# Patient Record
Sex: MALE | Race: WHITE
[De-identification: names, ages, dates, MRNs, and addresses within clinical notes are randomized per-mention and may not be internally consistent; named-entity substitution may affect disease eponyms.]

---

## 2020-02-26 ENCOUNTER — HOSPITAL ENCOUNTER (EMERGENCY)
Dept: HOSPITAL 74 - JER | Age: 55
Discharge: ANOTHER HOSPITAL | End: 2020-02-26
Payer: COMMERCIAL

## 2020-02-26 VITALS — DIASTOLIC BLOOD PRESSURE: 90 MMHG | HEART RATE: 94 BPM | SYSTOLIC BLOOD PRESSURE: 148 MMHG | TEMPERATURE: 98.9 F

## 2020-02-26 VITALS — BODY MASS INDEX: 22.9 KG/M2

## 2020-02-26 DIAGNOSIS — Z85.9: ICD-10-CM

## 2020-02-26 DIAGNOSIS — Z90.02: ICD-10-CM

## 2020-02-26 DIAGNOSIS — K76.9: ICD-10-CM

## 2020-02-26 DIAGNOSIS — B19.20: ICD-10-CM

## 2020-02-26 DIAGNOSIS — Z88.8: ICD-10-CM

## 2020-02-26 DIAGNOSIS — J95.09: Primary | ICD-10-CM

## 2020-02-26 DIAGNOSIS — F31.9: ICD-10-CM

## 2020-02-26 DIAGNOSIS — Z91.013: ICD-10-CM

## 2020-02-26 LAB
ALBUMIN SERPL-MCNC: 3 G/DL (ref 3.4–5)
ALP SERPL-CCNC: 84 U/L (ref 45–117)
ALT SERPL-CCNC: 45 U/L (ref 13–61)
ANION GAP SERPL CALC-SCNC: 7 MMOL/L (ref 8–16)
AST SERPL-CCNC: 87 U/L (ref 15–37)
BILIRUB SERPL-MCNC: 0.6 MG/DL (ref 0.2–1)
BUN SERPL-MCNC: 10.9 MG/DL (ref 7–18)
CALCIUM SERPL-MCNC: 7.9 MG/DL (ref 8.5–10.1)
CHLORIDE SERPL-SCNC: 102 MMOL/L (ref 98–107)
CO2 SERPL-SCNC: 26 MMOL/L (ref 21–32)
CREAT SERPL-MCNC: 0.6 MG/DL (ref 0.55–1.3)
DEPRECATED RDW RBC AUTO: 14 % (ref 11.9–15.9)
GLUCOSE SERPL-MCNC: 97 MG/DL (ref 74–106)
HCT VFR BLD CALC: 32.7 % (ref 35.4–49)
HGB BLD-MCNC: 11.1 GM/DL (ref 11.7–16.9)
MCH RBC QN AUTO: 35.8 PG (ref 25.7–33.7)
MCHC RBC AUTO-ENTMCNC: 34.1 G/DL (ref 32–35.9)
MCV RBC: 105 FL (ref 80–96)
PLATELET # BLD AUTO: 255 K/MM3 (ref 134–434)
PMV BLD: 8.1 FL (ref 7.5–11.1)
POTASSIUM SERPLBLD-SCNC: 5.1 MMOL/L (ref 3.5–5.1)
PROT SERPL-MCNC: 8 G/DL (ref 6.4–8.2)
RBC # BLD AUTO: 3.11 M/MM3 (ref 4–5.6)
SODIUM SERPL-SCNC: 135 MMOL/L (ref 136–145)
WBC # BLD AUTO: 5.9 K/MM3 (ref 4–10)

## 2020-02-26 NOTE — EKG
Test Reason : 

Blood Pressure : ***/*** mmHG

Vent. Rate : 084 BPM     Atrial Rate : 084 BPM

   P-R Int : 162 ms          QRS Dur : 092 ms

    QT Int : 374 ms       P-R-T Axes : 059 041 048 degrees

   QTc Int : 441 ms

 

NORMAL SINUS RHYTHM

MINIMAL VOLTAGE CRITERIA FOR LVH, MAY BE NORMAL VARIANT

BORDERLINE ECG

Confirmed by MD MARCY, WILMAR ([id number]) on 2/26/2020 4:37:14 PM

 

Referred By:             Confirmed By:WILMAR VIDAL MD

## 2020-02-26 NOTE — PDOC
Documentation entered by Dustin Perez SCRIBE, acting as scribe for 

Larissa Rodríguez DO.








Larissa Rodríguez DO:  This documentation has been prepared by the 

Chris ruiz Nirvannie, SCRIBE, under my direction and personally reviewed 

by me in its entirety.  I confirm that the documentation accurately reflects 

all work, treatment, procedures, and medical decision making performed by me.  





History of Present Illness





- General


Stated Complaint: TRACH PROBLEM


Time Seen by Provider: 02/26/20 02:48


History Source: Patient





- History of Present Illness


Initial Comments: 





02/26/20 03:23


The patient is a 54 year old male, with a significant past medical history of 

unknown cancer (s/p tracheostomy), alcohol abuse, Hepatitis C, depression, 

bipolar disorder, who presents to the emergency department with 3 days of a 

dislodged tracheostomy tube. As per patient, he accidentally coughed up his 

tracheostomy tube 3 days ago. Patient presented to detox prior to his arrival 

but, was found to have a dislodged trach, prompting his arrival to the ED. 

Patients last drink was earlier today. As per patients oncologist office, 

while his tracheostomy is in place he requires significant suctioning. He 

denies any recent fevers, chills, headache or dizziness. He denies any recent 

chest pain. History is limited secondary to the patients difficulty speaking. 





Allergies: fish derived, iodine, shellfish derived, seafood





Past History





- Past Medical History


Allergies/Adverse Reactions: 


 Allergies











Allergy/AdvReac Type Severity Reaction Status Date / Time


 


fish derived Allergy Severe Difficulty Verified 02/26/20 02:53





   Breathing  


 


iodine Allergy Severe Difficulty Verified 02/26/20 02:53





   Breathing  


 


shellfish derived Allergy Severe Difficulty Verified 02/26/20 02:53





   Breathing  


 


seafood Allergy Severe Difficulty Uncoded 02/26/20 02:53





   Breathing  











Home Medications: 


Ambulatory Orders





Lamotrigine [Lamictal] 100 mg PO BID #60 tablet 02/03/14 


Albuterol Sulfate Inhaler - [Ventolin HFA Inhaler -] 2 inh IH Q4H PRN #1 inh 02/ 06/14 


Budesonide/Formeterol Fumarate [SYMBICORT 80/4.5mcg -] 2 inh IH BID #0 inhaler 

02/06/14 


Quetiapine Fumarate [Seroquel -] 300 mg PO HS #0 tablet 02/06/14 


Lamotrigine [LaMICtal -] 100 mg PO BID #60 tablet 08/16/14 


Quetiapine Fumarate [Seroquel -] 300 mg PO HS #30 tablet 08/16/14 


Zolpidem Tartrate [Ambien] 10 mg PO HS #30 tablet 08/18/14 


Nicotine Inhaler [Nicotrol Inhaler -] 10 mg IH Q2H PRN #30 cartridge 08/19/14 








Anemia: No


Asthma: No


Cancer: No


Cardiac Disorders: No


CVA: No


COPD: No


CHF: No


Dementia: No


Diabetes: No


GI Disorders: No


 Disorders: No


HTN: No


Hypercholesterolemia: No


Kidney Stones: No


Liver Disease: Yes


Seizures: No


Thyroid Disease: No





- Surgical History


Abdominal Surgery: No


Appendectomy: No


Cardiac Surgery: No


Cholecystectomy: No


Lung Surgery: No


Neurologic Surgery: No


Orthopedic Surgery: No





- Reproductive History


Testicular Surgery: No





- Psycho Social/Smoking Cessation Hx


Smoking History: Current every day smoker


Have you smoked in the past 12 months: Yes


Number of Cigarettes Smoked Daily: 20


Cigars Per Day: 0


'Breaking Loose' booklet given: 02/02/14


Hx Alcohol Use: Yes (beer/vodka)


Drug/Substance Use Hx: Yes (cocaine)


Substance Use Type: Alcohol, Cocaine


Hx Substance Use Treatment: Yes ([facility] 2/2014)





**Review of Systems





- Review of Systems


Able to Perform ROS?: Yes


Comments:: 





02/26/20 03:24


GENERAL/CONSTITUTIONAL: No fever or chills. No weakness.


HEAD, EYES, EARS, NOSE AND THROAT: +Dislodged tracheostomy tube. No change in 

vision. No ear pain or discharge. No sore throat.


GASTROINTESTINAL: No nausea, vomiting, diarrhea or constipation.


GENITOURINARY: No dysuria, frequency, or change in urination.


CARDIOVASCULAR: No chest pain or shortness of breath.


RESPIRATORY: No cough, wheezing, or hemoptysis.


MUSCULOSKELETAL: No joint or muscle swelling or pain. No neck or back pain.


SKIN: No rash


NEUROLOGIC: No headache, vertigo, loss of consciousness, or change in strength/

sensation.


ENDOCRINE: No increased thirst. No abnormal weight change.


HEMATOLOGIC/LYMPHATIC: No anemia, easy bleeding, or history of blood clots.


ALLERGIC/IMMUNOLOGIC: No hives or skin allergy.





All Other Systems: Reviewed and Negative





*Physical Exam





- Vital Signs


 Last Vital Signs











Temp Pulse Resp BP Pulse Ox


 


 98.9 F   94 H  18   148/90   95 


 


 02/26/20 02:53  02/26/20 02:53  02/26/20 02:53  02/26/20 02:53  02/26/20 02:53














- Physical Exam





02/26/20 03:24


GENERAL: Awake, in no acute distress


HEAD: No signs of trauma


EYES: PERRLA, EOMI, sclera anicteric, conjunctiva clear, visual acuity grossly 

intact


ENT: +Open tracheostomy site with fixed secretions. No stridor.


NECK: Normal ROM, supple, no lymphadenopathy or JVD.


LUNGS: Breath sounds equal, clear to auscultation bilaterally.  No wheezes, and 

no crackles. Normal work of breathing.


HEART: Regular rate and rhythm, normal S1 and S2, no murmurs, rubs or gallops


ABDOMEN: Soft, nontender, normoactive bowel sounds.  No guarding.  Non-

distended. 


: Deferred.


BACK: No midline tenderness.


EXTREMITIES: Normal range of motion, no edema.  No clubbing or cyanosis. No 

erythema, or tenderness


NEUROLOGICAL: Alert, and oriented x4, Nonfocal. 


SKIN: Warm, Dry, normal turgor, no rashes or lesions noted.








ED Treatment Course





- LABORATORY


CBC & Chemistry Diagram: 


 02/26/20 03:40





 02/26/20 03:40





- ADDITIONAL ORDERS


Additional order review: 


 Laboratory  Results











  02/26/20





  03:40


 


Sodium  Cancelled


 


Potassium  Cancelled


 


Chloride  Cancelled


 


Carbon Dioxide  Cancelled


 


Anion Gap  Cancelled


 


BUN  Cancelled


 


Creatinine  Cancelled


 


Est GFR (CKD-EPI)AfAm  Cancelled


 


Est GFR (CKD-EPI)NonAf  Cancelled


 


Random Glucose  Cancelled


 


Calcium  Cancelled


 


Total Bilirubin  Cancelled


 


AST  Cancelled


 


ALT  Cancelled


 


Alkaline Phosphatase  Cancelled


 


Total Protein  Cancelled


 


Albumin  Cancelled








 











  02/26/20





  03:40


 


RBC  3.11 L


 


MCV  105.0 H


 


MCHC  34.1


 


RDW  14.0


 


MPV  8.1














- RADIOLOGY


Radiology Studies Ordered: 














 Category Date Time Status


 


 CHEST X-RAY PORTABLE* [RAD] Stat Radiology  02/26/20 03:12 Taken














- Medications


Given in the ED: 


ED Medications














Discontinued Medications














Generic Name Dose Route Start Last Admin





  Trade Name Freq  PRN Reason Stop Dose Admin


 


Chlordiazepoxide HCl  25 mg  02/26/20 03:01  02/26/20 03:58





  Librium -  PO  02/26/20 03:02  25 mg





  ONCE ONE   Administration





     





     





     





     














Medical Decision Making





- Critical Care Time


Total Critical Care Time (minutes): 60


Critical Care Statement: The care of this patient involved high complexity 

decision making to prevent further life threatening deterioration of the patient

's condition and/or to evaluate & treat vital organ system(s) failure or risk 

of failure.





- Medical Decision Making





02/26/20 04:40


54-year-old male sent for increased secretions from his tracheal stoma sites


Patient's head and neck surgeon at [facility], Dr. Marvin'

s team, head and neck surgery/oncology has accepted the patient for transfer to 

the emergency department for further evaluation


Patient was advised that we do not provide this service at this hospital and he 

will be transferred for continuity of care and specialty consultation


Trach collar applied, current )2 sat 99%, he has required suctioning multiple 

times while in the emergency department


Librium 25 mg given prior to transfer


Chest x-ray shows some nonspecific mild increased markings bilaterally with no 

focal consolidation


Awaiting ALS transport to the emergency department


02/26/20 04:44








Discharge





- Discharge Information


Problems reviewed: Yes


Clinical Impression/Diagnosis: 


 Increased tracheal secretions, Status post laryngectomy





Condition: Guarded





- Admission


No





- Follow up/Referral


Referrals: 


Morena Hudson MD [Primary Care Provider] - 





- Patient Discharge Instructions





- Post Discharge Activity





- Transfer to Acute Care Facility


Receiving Facility Name: [facility]